# Patient Record
Sex: FEMALE | Race: WHITE | NOT HISPANIC OR LATINO | URBAN - METROPOLITAN AREA
[De-identification: names, ages, dates, MRNs, and addresses within clinical notes are randomized per-mention and may not be internally consistent; named-entity substitution may affect disease eponyms.]

---

## 2020-07-20 ENCOUNTER — OFFICE VISIT (OUTPATIENT)
Dept: URBAN - METROPOLITAN AREA TELEHEALTH 2 | Facility: TELEHEALTH | Age: 50
End: 2020-07-20

## 2020-07-23 ENCOUNTER — OFFICE VISIT (OUTPATIENT)
Dept: URBAN - METROPOLITAN AREA TELEHEALTH 2 | Facility: TELEHEALTH | Age: 50
End: 2020-07-23

## 2021-09-01 ENCOUNTER — OFFICE VISIT (OUTPATIENT)
Dept: URBAN - METROPOLITAN AREA CLINIC 63 | Facility: CLINIC | Age: 51
End: 2021-09-01

## 2022-07-09 ENCOUNTER — TELEPHONE ENCOUNTER (OUTPATIENT)
Dept: URBAN - METROPOLITAN AREA CLINIC 121 | Facility: CLINIC | Age: 52
End: 2022-07-09

## 2022-07-09 RX ORDER — DEXLANSOPRAZOLE 60 MG/1
TAKE 1 TAB PO QAM 30-60 MINS BEFORE BREAKFAST CAPSULE, DELAYED RELEASE ORAL ONCE A DAY
Refills: 11 | OUTPATIENT
Start: 2019-05-29 | End: 2021-08-31

## 2022-07-09 RX ORDER — ESOMEPRAZOLE MAGNESIUM 40 MG
ONCE A DAY CAPSULE,DELAYED RELEASE (ENTERIC COATED) ORAL ONCE A DAY
Refills: 2 | OUTPATIENT
Start: 2016-05-26 | End: 2016-12-29

## 2022-07-09 RX ORDER — DEXLANSOPRAZOLE 60 MG/1
TAKE 1 TAB PO QAM 30-60 MINS BEFORE BREAKFAST CAPSULE, DELAYED RELEASE ORAL ONCE A DAY
Refills: 6 | OUTPATIENT
Start: 2017-09-20 | End: 2018-09-28

## 2022-07-09 RX ORDER — CYCLOSPORINE 0.5 MG/ML
EMULSION OPHTHALMIC
Refills: 0 | OUTPATIENT
Start: 2021-02-22 | End: 2021-09-01

## 2022-07-09 RX ORDER — ALBUTEROL SULFATE 0.63 MG/3ML
SOLUTION RESPIRATORY (INHALATION)
Refills: 0 | OUTPATIENT
Start: 2021-01-21 | End: 2021-08-31

## 2022-07-09 RX ORDER — DEXLANSOPRAZOLE 60 MG/1
TAKE 1 TAB PO QAM 30-60 MINS BEFORE BREAKFAST CAPSULE, DELAYED RELEASE ORAL ONCE A DAY
Refills: 0 | OUTPATIENT
Start: 2019-03-18 | End: 2019-05-17

## 2022-07-09 RX ORDER — BUTALBITAL, ACETAMINOPHEN, CAFFEINE, AND CODEINE PHOSPHATE 50; 300; 40; 30 MG/1; MG/1; MG/1; MG/1
CAPSULE ORAL AS NEEDED
Refills: 0 | OUTPATIENT
Start: 2017-08-03 | End: 2018-03-20

## 2022-07-09 RX ORDER — ESTRADIOL 2 MG/1
TABLET ORAL
Refills: 0 | OUTPATIENT
Start: 2021-07-15 | End: 2021-09-01

## 2022-07-09 RX ORDER — PANTOPRAZOLE SODIUM 20 MG
TABLET, DELAYED RELEASE (ENTERIC COATED) ORAL ONCE A DAY
Refills: 2 | OUTPATIENT
Start: 2016-03-17 | End: 2016-03-17

## 2022-07-09 RX ORDER — DEXLANSOPRAZOLE 60 MG/1
TAKE 1 TAB PO QAM 30-60 MINS BEFORE BREAKFAST CAPSULE, DELAYED RELEASE ORAL ONCE A DAY
Refills: 0 | OUTPATIENT
Start: 2019-02-28 | End: 2019-03-18

## 2022-07-09 RX ORDER — DEXLANSOPRAZOLE 60 MG/1
TAKE 1 TAB PO QAM 30-60 MINS BEFORE BREAKFAST CAPSULE, DELAYED RELEASE ORAL ONCE A DAY
Refills: 0 | OUTPATIENT
Start: 2019-05-17 | End: 2021-08-31

## 2022-07-09 RX ORDER — CYCLOSPORINE 0.5 MG/ML
EMULSION OPHTHALMIC TWICE A DAY
Refills: 0 | OUTPATIENT
Start: 2012-03-01 | End: 2016-03-17

## 2022-07-09 RX ORDER — LEVOTHYROXINE SODIUM 100 MCG
ROTATE WITH 88MG TABLET ORAL ONCE A DAY
Refills: 0 | OUTPATIENT
Start: 2012-03-01 | End: 2016-03-17

## 2022-07-09 RX ORDER — ESOMEPRAZOLE MAGNESIUM 40 MG
1 Q AM 30-60MINUTES BEFORE BREAKFAST CAPSULE,DELAYED RELEASE (ENTERIC COATED) ORAL
Refills: 0 | OUTPATIENT
Start: 2012-05-16 | End: 2012-06-14

## 2022-07-09 RX ORDER — LEVOTHYROXINE SODIUM 100 MCG
ROTATE WITH 88MG TABLET ORAL ONCE A DAY
Refills: 2 | OUTPATIENT
Start: 2016-03-17 | End: 2016-03-17

## 2022-07-09 RX ORDER — DEXLANSOPRAZOLE 60 MG/1
TAKE 1 TAB PO QAM 30-60 MINS BEFORE BREAKFAST CAPSULE, DELAYED RELEASE ORAL ONCE A DAY
Refills: 4 | OUTPATIENT
Start: 2018-09-28 | End: 2019-02-28

## 2022-07-09 RX ORDER — TRIAMTERENE AND HYDROCHLOROTHIAZIDE 37.5; 25 MG/1; MG/1
CAPSULE ORAL ONCE A DAY
Refills: 2 | OUTPATIENT
Start: 2016-03-17 | End: 2016-05-26

## 2022-07-09 RX ORDER — LEVOTHYROXINE SODIUM 88 MCG
TABLET ORAL ONCE A DAY
Refills: 1 | OUTPATIENT
Start: 2016-03-17 | End: 2016-05-26

## 2022-07-09 RX ORDER — DEXLANSOPRAZOLE 60 MG/1
TAKE 1 TAB PO QAM 30-60 MINS BEFORE BREAKFAST CAPSULE, DELAYED RELEASE ORAL ONCE A DAY
Refills: 11 | OUTPATIENT
Start: 2020-07-23 | End: 2021-08-18

## 2022-07-09 RX ORDER — PANTOPRAZOLE SODIUM 20 MG
TABLET, DELAYED RELEASE (ENTERIC COATED) ORAL ONCE A DAY
Refills: 0 | OUTPATIENT
Start: 2012-03-01 | End: 2016-03-17

## 2022-07-09 RX ORDER — ESTRADIOL 2 MG/1
TABLET ORAL TAKE AS DIRECTED
Refills: 0 | OUTPATIENT
Start: 2019-05-07 | End: 2021-08-31

## 2022-07-09 RX ORDER — ESCITALOPRAM OXALATE 5 MG/1
TABLET, FILM COATED ORAL ONCE A DAY
Refills: 0 | OUTPATIENT
Start: 2018-02-15 | End: 2018-02-15

## 2022-07-09 RX ORDER — DEXLANSOPRAZOLE 60 MG/1
TAKE 1 TAB PO QAM 30-60 MINS BEFORE BREAKFAST CAPSULE, DELAYED RELEASE ORAL ONCE A DAY
Refills: 2 | OUTPATIENT
Start: 2017-09-14 | End: 2017-09-14

## 2022-07-09 RX ORDER — DEXLANSOPRAZOLE 60 MG/1
TAKE 1 TAB PO QAM 30-60 MINS BEFORE BREAKFAST CAPSULE, DELAYED RELEASE ORAL ONCE A DAY
Refills: 0 | OUTPATIENT
Start: 2021-08-18 | End: 2021-09-01

## 2022-07-09 RX ORDER — CYCLOBENZAPRINE HYDROCHLORIDE 10 MG/1
TABLET, FILM COATED ORAL ONCE A DAY
Refills: 0 | OUTPATIENT
Start: 2017-08-03 | End: 2017-08-03

## 2022-07-09 RX ORDER — CYCLOSPORINE 0.5 MG/ML
EMULSION OPHTHALMIC TWICE A DAY
Refills: 2 | OUTPATIENT
Start: 2016-03-17 | End: 2016-05-26

## 2022-07-09 RX ORDER — DEXLANSOPRAZOLE 60 MG/1
TAKE 1 CAPSULE BY MOUTH IN THE MORNING 30-60 MINUTES BEFORE BREAKFAST CAPSULE, DELAYED RELEASE ORAL ONCE A DAY
Refills: 1 | OUTPATIENT
Start: 2020-06-16 | End: 2021-08-31

## 2022-07-09 RX ORDER — CYCLOSPORINE 0.5 MG/ML
EMULSION OPHTHALMIC TAKE AS DIRECTED
Refills: 0 | OUTPATIENT
Start: 2019-05-29 | End: 2021-08-31

## 2022-07-09 RX ORDER — LEVOTHYROXINE SODIUM 88 MCG
TABLET ORAL ONCE A DAY
Refills: 0 | OUTPATIENT
Start: 2012-03-01 | End: 2016-03-17

## 2022-07-09 RX ORDER — MAGNESIUM OXIDE 200 MG
TABLET,CHEWABLE ORAL ONCE A DAY
Refills: 2 | OUTPATIENT
Start: 2016-03-17 | End: 2016-05-26

## 2022-07-09 RX ORDER — ZOLPIDEM TARTRATE 5 MG/1
TABLET, FILM COATED ORAL
Refills: 2 | OUTPATIENT
Start: 2016-03-17 | End: 2016-03-17

## 2022-07-09 RX ORDER — DEXLANSOPRAZOLE 60 MG/1
TAKE 1 TAB PO QAM 30-60 MINS BEFORE BREAKFAST CAPSULE, DELAYED RELEASE ORAL ONCE A DAY
Refills: 1 | OUTPATIENT
Start: 2017-08-03 | End: 2017-09-14

## 2022-07-09 RX ORDER — ZOLPIDEM TARTRATE 5 MG/1
TABLET, FILM COATED ORAL
Refills: 0 | OUTPATIENT
Start: 2012-03-01 | End: 2016-03-17

## 2022-07-09 RX ORDER — TOPIRAMATE 150 MG/1
CAPSULE, EXTENDED RELEASE ORAL ONCE A DAY
Refills: 2 | OUTPATIENT
Start: 2016-03-17 | End: 2016-05-26

## 2022-07-09 RX ORDER — TRIAMTERENE AND HYDROCHLOROTHIAZIDE 37.5; 25 MG/1; MG/1
CAPSULE ORAL ONCE A DAY
Refills: 0 | OUTPATIENT
Start: 2012-03-01 | End: 2016-03-17

## 2022-07-10 ENCOUNTER — TELEPHONE ENCOUNTER (OUTPATIENT)
Dept: URBAN - METROPOLITAN AREA CLINIC 121 | Facility: CLINIC | Age: 52
End: 2022-07-10

## 2022-07-10 RX ORDER — ESOMEPRAZOLE MAGNESIUM 40 MG
1 Q AM 30-60MINUTES BEFORE BREAKFAST CAPSULE,DELAYED RELEASE (ENTERIC COATED) ORAL
Refills: 3 | Status: ACTIVE | COMMUNITY
Start: 2012-06-14

## 2022-07-10 RX ORDER — TRIAMTERENE AND HYDROCHLOROTHIAZIDE 37.5; 25 MG/1; MG/1
CAPSULE ORAL ONCE A DAY
Refills: 2 | Status: ACTIVE | COMMUNITY
Start: 2016-05-26

## 2022-07-10 RX ORDER — ESTRADIOL 2 MG/1
TABLET ORAL ONCE A DAY
Refills: 0 | Status: ACTIVE | COMMUNITY
Start: 2021-09-01

## 2022-07-10 RX ORDER — LEVOTHYROXINE SODIUM 88 MCG
TABLET ORAL ONCE A DAY
Refills: 1 | Status: ACTIVE | COMMUNITY
Start: 2016-05-26

## 2022-07-10 RX ORDER — CYCLOBENZAPRINE HYDROCHLORIDE 10 MG/1
TABLET, FILM COATED ORAL AS NEEDED
Refills: 0 | Status: ACTIVE | COMMUNITY
Start: 2017-08-03

## 2022-07-10 RX ORDER — CIPROFLOXACIN HYDROCHLORIDE 500 MG/1
TABLET, FILM COATED ORAL TWICE A DAY
Refills: 0 | Status: ACTIVE | COMMUNITY
Start: 2013-01-16

## 2022-07-10 RX ORDER — MAGNESIUM OXIDE 200 MG
TABLET,CHEWABLE ORAL ONCE A DAY
Refills: 2 | Status: ACTIVE | COMMUNITY
Start: 2016-05-26

## 2022-07-10 RX ORDER — LEVOTHYROXINE SODIUM 88 MCG
TABLET ORAL ONCE A DAY
Refills: 0 | Status: ACTIVE | COMMUNITY
Start: 2021-09-01

## 2022-07-10 RX ORDER — DEXLANSOPRAZOLE 60 MG/1
TAKE 1 TAB PO QAM 30-60 MINS BEFORE BREAKFAST CAPSULE, DELAYED RELEASE ORAL ONCE A DAY
Refills: 11 | Status: ACTIVE | COMMUNITY
Start: 2021-09-01

## 2022-07-10 RX ORDER — CYCLOSPORINE 0.5 MG/ML
EMULSION OPHTHALMIC TWICE A DAY
Refills: 2 | Status: ACTIVE | COMMUNITY
Start: 2016-05-26

## 2022-07-10 RX ORDER — METRONIDAZOLE 500 MG/1
TABLET ORAL THREE TIMES A DAY
Refills: 0 | Status: ACTIVE | COMMUNITY
Start: 2013-01-16

## 2022-07-10 RX ORDER — TOPIRAMATE 150 MG/1
CAPSULE, EXTENDED RELEASE ORAL ONCE A DAY
Refills: 2 | Status: ACTIVE | COMMUNITY
Start: 2016-05-26

## 2022-07-10 RX ORDER — ESOMEPRAZOLE MAGNESIUM 40 MG
TAKE ONE BY MOUTH DAILY AS DIRECTED CAPSULE,DELAYED RELEASE (ENTERIC COATED) ORAL
Refills: 0 | Status: ACTIVE | COMMUNITY
Start: 2017-07-20

## 2022-07-10 RX ORDER — CYCLOSPORINE 0.5 MG/ML
EMULSION OPHTHALMIC
Refills: 0 | Status: ACTIVE | COMMUNITY
Start: 2021-09-01

## 2022-07-10 RX ORDER — TRAMADOL HCL 50 MG
TAKE 1 TAB PO Q6HRS PRN FOR PAIN TABLET ORAL
Refills: 0 | Status: ACTIVE | COMMUNITY
Start: 2013-01-29

## 2022-07-10 RX ORDER — ESOMEPRAZOLE MAGNESIUM 40 MG
TAKE ONE BY MOUTH DAILY AS DIRECTED CAPSULE,DELAYED RELEASE (ENTERIC COATED) ORAL
Refills: 2 | Status: ACTIVE | COMMUNITY
Start: 2016-12-29

## 2022-07-10 RX ORDER — BUTALBITAL, ACETAMINOPHEN, CAFFEINE, AND CODEINE PHOSPHATE 50; 300; 40; 30 MG/1; MG/1; MG/1; MG/1
CAPSULE ORAL AS NEEDED
Refills: 0 | Status: ACTIVE | COMMUNITY
Start: 2018-03-20

## 2022-09-20 ENCOUNTER — ERX REFILL RESPONSE (OUTPATIENT)
Dept: URBAN - METROPOLITAN AREA CLINIC 63 | Facility: CLINIC | Age: 52
End: 2022-09-20

## 2022-09-20 RX ORDER — DEXLANSOPRAZOLE 60 MG/1
TAKE 1 CAPSULE BY MOUTH EVERY MORNING 30-60 MINUTES BEFORE BREAKFAST CAPSULE, DELAYED RELEASE ORAL
Qty: 30 CAPSULE | Refills: 1 | OUTPATIENT

## 2022-09-20 RX ORDER — DEXLANSOPRAZOLE 60 MG/1
TAKE 1 TAB PO QAM 30-60 MINS BEFORE BREAKFAST CAPSULE, DELAYED RELEASE ORAL ONCE A DAY
Refills: 11 | OUTPATIENT

## 2022-09-27 ENCOUNTER — CLAIMS CREATED FROM THE CLAIM WINDOW (OUTPATIENT)
Dept: URBAN - METROPOLITAN AREA CLINIC 60 | Facility: CLINIC | Age: 52
End: 2022-09-27
Payer: COMMERCIAL

## 2022-09-27 ENCOUNTER — TELEPHONE ENCOUNTER (OUTPATIENT)
Dept: URBAN - METROPOLITAN AREA CLINIC 63 | Facility: CLINIC | Age: 52
End: 2022-09-27

## 2022-09-27 VITALS
HEIGHT: 62 IN | RESPIRATION RATE: 12 BRPM | SYSTOLIC BLOOD PRESSURE: 134 MMHG | OXYGEN SATURATION: 100 % | DIASTOLIC BLOOD PRESSURE: 72 MMHG | WEIGHT: 176.2 LBS | TEMPERATURE: 98.2 F | BODY MASS INDEX: 32.42 KG/M2 | HEART RATE: 70 BPM

## 2022-09-27 DIAGNOSIS — R10.30 LOWER ABDOMINAL PAIN: ICD-10-CM

## 2022-09-27 DIAGNOSIS — R10.32 LEFT LOWER QUADRANT ABDOMINAL PAIN: ICD-10-CM

## 2022-09-27 DIAGNOSIS — K59.09 OTHER CONSTIPATION: ICD-10-CM

## 2022-09-27 PROBLEM — 307496006: Status: ACTIVE | Noted: 2022-09-27

## 2022-09-27 PROCEDURE — 99214 OFFICE O/P EST MOD 30 MIN: CPT | Performed by: NURSE PRACTITIONER

## 2022-09-27 RX ORDER — MAGNESIUM OXIDE 200 MG
TABLET,CHEWABLE ORAL ONCE A DAY
Refills: 2 | Status: ACTIVE | COMMUNITY
Start: 2016-05-26

## 2022-09-27 RX ORDER — ESTRADIOL 2 MG/1
TABLET ORAL ONCE A DAY
Refills: 0 | Status: ACTIVE | COMMUNITY
Start: 2021-09-01

## 2022-09-27 RX ORDER — DEXLANSOPRAZOLE 60 MG/1
TAKE 1 CAPSULE BY MOUTH EVERY MORNING 30-60 MINUTES BEFORE BREAKFAST CAPSULE, DELAYED RELEASE ORAL
Qty: 30 CAPSULE | Refills: 1 | Status: ACTIVE | COMMUNITY

## 2022-09-27 RX ORDER — ATOGEPANT 60 MG/1
TABLET ORAL
Qty: 30 TABLET | Refills: 2 | Status: ACTIVE | COMMUNITY

## 2022-09-27 RX ORDER — CYCLOBENZAPRINE HYDROCHLORIDE 10 MG/1
TABLET, FILM COATED ORAL AS NEEDED
Refills: 0 | Status: ACTIVE | COMMUNITY
Start: 2017-08-03

## 2022-09-27 RX ORDER — LEVOTHYROXINE SODIUM 88 MCG
TABLET ORAL ONCE A DAY
Refills: 0 | Status: ACTIVE | COMMUNITY
Start: 2021-09-01

## 2022-09-27 RX ORDER — RIMEGEPANT SULFATE 75 MG/75MG
TABLET, ORALLY DISINTEGRATING ORAL
Qty: 8 TABLET | Refills: 1 | Status: ACTIVE | COMMUNITY

## 2022-09-27 RX ORDER — CYCLOSPORINE 0.5 MG/ML
EMULSION OPHTHALMIC TWICE A DAY
Refills: 2 | Status: ACTIVE | COMMUNITY
Start: 2016-05-26

## 2022-09-27 RX ORDER — DICYCLOMINE HYDROCHLORIDE 10 MG/1
1 CAPSULES CAPSULE ORAL
Qty: 60 | Refills: 1 | OUTPATIENT
Start: 2022-09-27 | End: 2022-11-25

## 2022-09-27 RX ORDER — DICYCLOMINE HYDROCHLORIDE 10 MG/1
1 CAPSULES CAPSULE ORAL
Qty: 60 | Refills: 1 | Status: ACTIVE | COMMUNITY
Start: 2022-09-27 | End: 2022-11-25

## 2022-09-27 NOTE — HPI-PREVIOUS PROCEDURES
Her last EGD was in 2016 and revealed reflux related change in the esophagus and chronic gastritis.  Her last colonoscopy was in March 2018.  This was performed for abdominal pain and bowel irregularities and revealed only small internal hemorrhoids.  The sigmoid colon was however moderately tortuous and there was some suspicion that she may have adhesions from prior surgery.  There was no evidence of diverticulosis on her colonoscopy. She has a family history of polyps.

## 2022-09-27 NOTE — PHYSICAL EXAM GASTROINTESTINAL
Abdomen , soft, mildly tender RLQ and LLQ, nondistended , no guarding or rigidity , no masses palpable , normal bowel sounds , Liver and Spleen , no hepatomegaly present , no hepatosplenomegaly , liver nontender , spleen not palpable

## 2022-09-27 NOTE — HPI-TODAY'S VISIT:
This is a 52-year-old female patient with a history of GERD who presents to the office for evaluation of constipation and abdominal pain x 3 weeks.  She was last seen in September 2021.  Today she reports episodes of constipation at times as her baseline along with occasional bouts of loose stools. However, over the past 3 weeks she has become increasingly constipated. She has tried increasing her fiber and eating more salads etc without much success. She has also been experiencing some lower abdominal pain over the past couple of weeks. She denies fever, admits to mild nausea on occasion. She has been trying to lose weight and has in general changed her diet because of that. She is eating more healthily.  She remains on Dexilant and denies heartburn, dysphagia or reflux providing she takes her medication.

## 2022-10-12 ENCOUNTER — TELEPHONE ENCOUNTER (OUTPATIENT)
Dept: URBAN - METROPOLITAN AREA CLINIC 63 | Facility: CLINIC | Age: 52
End: 2022-10-12

## 2022-10-19 ENCOUNTER — DASHBOARD ENCOUNTERS (OUTPATIENT)
Age: 52
End: 2022-10-19

## 2022-10-20 ENCOUNTER — OFFICE VISIT (OUTPATIENT)
Dept: URBAN - METROPOLITAN AREA TELEHEALTH 1 | Facility: TELEHEALTH | Age: 52
End: 2022-10-20
Payer: COMMERCIAL

## 2022-10-20 ENCOUNTER — TELEPHONE ENCOUNTER (OUTPATIENT)
Dept: URBAN - METROPOLITAN AREA CLINIC 63 | Facility: CLINIC | Age: 52
End: 2022-10-20

## 2022-10-20 VITALS — BODY MASS INDEX: 32.39 KG/M2 | HEIGHT: 62 IN | WEIGHT: 176 LBS

## 2022-10-20 DIAGNOSIS — K58.1 IRRITABLE BOWEL SYNDROME WITH CONSTIPATION: ICD-10-CM

## 2022-10-20 DIAGNOSIS — K59.09 OTHER CONSTIPATION: ICD-10-CM

## 2022-10-20 DIAGNOSIS — R10.84 GENERALIZED ABDOMINAL PAIN: ICD-10-CM

## 2022-10-20 PROBLEM — 440630006: Status: ACTIVE | Noted: 2022-10-20

## 2022-10-20 PROCEDURE — 99214 OFFICE O/P EST MOD 30 MIN: CPT | Performed by: NURSE PRACTITIONER

## 2022-10-20 RX ORDER — CYCLOBENZAPRINE HYDROCHLORIDE 10 MG/1
TABLET, FILM COATED ORAL AS NEEDED
Refills: 0 | Status: ACTIVE | COMMUNITY
Start: 2017-08-03

## 2022-10-20 RX ORDER — MAGNESIUM OXIDE 200 MG
TABLET,CHEWABLE ORAL ONCE A DAY
Refills: 2 | Status: ACTIVE | COMMUNITY
Start: 2016-05-26

## 2022-10-20 RX ORDER — LEVOTHYROXINE SODIUM 88 MCG
TABLET ORAL ONCE A DAY
Refills: 0 | Status: ACTIVE | COMMUNITY
Start: 2021-09-01

## 2022-10-20 RX ORDER — ATOGEPANT 60 MG/1
TABLET ORAL
Qty: 30 TABLET | Refills: 2 | Status: ACTIVE | COMMUNITY

## 2022-10-20 RX ORDER — ESTRADIOL 2 MG/1
TABLET ORAL ONCE A DAY
Refills: 0 | Status: ACTIVE | COMMUNITY
Start: 2021-09-01

## 2022-10-20 RX ORDER — DICYCLOMINE HYDROCHLORIDE 10 MG/1
1 CAPSULES CAPSULE ORAL
Qty: 60 | Refills: 1 | Status: ACTIVE | COMMUNITY
Start: 2022-09-27 | End: 2022-11-25

## 2022-10-20 RX ORDER — CYCLOSPORINE 0.5 MG/ML
EMULSION OPHTHALMIC TWICE A DAY
Refills: 2 | Status: ACTIVE | COMMUNITY
Start: 2016-05-26

## 2022-10-20 RX ORDER — RIMEGEPANT SULFATE 75 MG/75MG
TABLET, ORALLY DISINTEGRATING ORAL
Qty: 8 TABLET | Refills: 1 | Status: ACTIVE | COMMUNITY

## 2022-10-20 RX ORDER — DEXLANSOPRAZOLE 60 MG/1
TAKE 1 CAPSULE BY MOUTH EVERY MORNING 30-60 MINUTES BEFORE BREAKFAST CAPSULE, DELAYED RELEASE ORAL
Qty: 30 CAPSULE | Refills: 1 | Status: ACTIVE | COMMUNITY

## 2022-10-20 NOTE — HPI-TODAY'S VISIT:
This is a 52-year-old female patient with a history of GERD who presents to the office for re-evaluation of abdominal pain and to discuss her recent CT and any persistent symptoms. She was last seen on 9/27/22.  Today she reports that she is taking the Dicyclomine twice daily but still has a lot of abdominal discomfort. It is not severe, but is very bothersome. She feels that her pain is being caused by her constipation. She is taking the Miralax bid and still only moving her bowels about twice a week on average. She is following a high fiber diet. She states that her father struggled with constipation his entire life.  She remains on Dexilant and denies heartburn, dysphagia or reflux providing she takes her medication.

## 2022-10-20 NOTE — HPI-PREVIOUS IMAGING
Her CT performed on 9/27/22 did not reveal evidence of diverticulitis, which was a possible diagnosis. She was therefore advised to follow a high fiber diet and take the prescribed Dicyclomine 2-3 times daily.

## 2022-11-29 ENCOUNTER — WEB ENCOUNTER (OUTPATIENT)
Dept: URBAN - METROPOLITAN AREA CLINIC 60 | Facility: CLINIC | Age: 52
End: 2022-11-29

## 2022-11-29 RX ORDER — DEXLANSOPRAZOLE 60 MG/1
1 CAPSULE CAPSULE, DELAYED RELEASE ORAL ONCE A DAY
Qty: 30 TABLET | Refills: 6 | OUTPATIENT

## 2022-12-01 ENCOUNTER — OFFICE VISIT (OUTPATIENT)
Dept: URBAN - METROPOLITAN AREA TELEHEALTH 1 | Facility: TELEHEALTH | Age: 52
End: 2022-12-01
Payer: COMMERCIAL

## 2022-12-01 DIAGNOSIS — K21.00 GASTROESOPHAGEAL REFLUX DISEASE WITH ESOPHAGITIS WITHOUT HEMORRHAGE: ICD-10-CM

## 2022-12-01 DIAGNOSIS — K58.2 IRRITABLE BOWEL SYNDROME WITH BOTH CONSTIPATION AND DIARRHEA: ICD-10-CM

## 2022-12-01 PROBLEM — 10743008: Status: ACTIVE | Noted: 2022-12-01

## 2022-12-01 PROBLEM — 266433003: Status: ACTIVE | Noted: 2022-12-01

## 2022-12-01 PROCEDURE — 99214 OFFICE O/P EST MOD 30 MIN: CPT | Performed by: NURSE PRACTITIONER

## 2022-12-01 RX ORDER — DEXLANSOPRAZOLE 60 MG/1
1 CAPSULE CAPSULE, DELAYED RELEASE ORAL ONCE A DAY
Qty: 30 TABLET | Refills: 6 | Status: ACTIVE | COMMUNITY

## 2022-12-01 RX ORDER — CYCLOSPORINE 0.5 MG/ML
EMULSION OPHTHALMIC TWICE A DAY
Refills: 2 | Status: ACTIVE | COMMUNITY
Start: 2016-05-26

## 2022-12-01 RX ORDER — LEVOTHYROXINE SODIUM 88 MCG
TABLET ORAL ONCE A DAY
Refills: 0 | Status: ACTIVE | COMMUNITY
Start: 2021-09-01

## 2022-12-01 RX ORDER — ATOGEPANT 60 MG/1
TABLET ORAL
Qty: 30 TABLET | Refills: 2 | Status: ACTIVE | COMMUNITY

## 2022-12-01 RX ORDER — DEXLANSOPRAZOLE 60 MG/1
TAKE 1 CAPSULE BY MOUTH EVERY MORNING 30-60 MINUTES BEFORE BREAKFAST CAPSULE, DELAYED RELEASE ORAL
Qty: 30 CAPSULE | Refills: 1 | Status: ACTIVE | COMMUNITY

## 2022-12-01 RX ORDER — RIMEGEPANT SULFATE 75 MG/75MG
TABLET, ORALLY DISINTEGRATING ORAL
Qty: 8 TABLET | Refills: 1 | Status: ACTIVE | COMMUNITY

## 2022-12-01 RX ORDER — CYCLOBENZAPRINE HYDROCHLORIDE 10 MG/1
TABLET, FILM COATED ORAL AS NEEDED
Refills: 0 | Status: ACTIVE | COMMUNITY
Start: 2017-08-03

## 2022-12-01 RX ORDER — ESTRADIOL 2 MG/1
TABLET ORAL ONCE A DAY
Refills: 0 | Status: ACTIVE | COMMUNITY
Start: 2021-09-01

## 2022-12-01 RX ORDER — MAGNESIUM OXIDE 200 MG
TABLET,CHEWABLE ORAL ONCE A DAY
Refills: 2 | Status: ACTIVE | COMMUNITY
Start: 2016-05-26

## 2022-12-01 NOTE — HPI-TODAY'S VISIT:
This is a 52-year-old female patient with a history of GERD who presents for follow-up of abdominal pain.  She was last seen on 9/27/2022.  Today she reports doing much better. She is taking Lactulose 5mg daily and is moving her bowels regularly. She is only requiring the Dicyclomine prn and occasionally. Her GERD symptoms are well-controlled with her Dexilant.  When last seen she reported constipation and abdominal pain x3 weeks.  Her baseline is somewhat irregular with episodes of constipation and occasional bouts of loose stools.  However, she experienced worsening constipation and lower abdominal cramping pain.

## 2022-12-01 NOTE — HPI-PREVIOUS IMAGING
A CT was ordered to rule out diverticulitis.  This was performed on 9/27/2022.  Findings included a few scattered diverticula but no evidence of diverticulitis.  No other abnormal findings were noted.  The liver, spleen, pancreas, adrenal glands and kidneys appeared normal.  The small and large bowel were also normal in appearance.

## 2022-12-01 NOTE — HPI-PREVIOUS PROCEDURES
Her last colonoscopy was in March 2018, performed for abdominal pain and bowel irregularities and revealed only small internal hemorrhoids.  The sigmoid colon was however moderately tortuous and there was some suspicion that she may have ideations from prior surgery.  She has a family history of polyps.  Her last EGD was in 2016 and revealed reflux related change in the esophagus and chronic gastritis.

## 2022-12-31 ENCOUNTER — WEB ENCOUNTER (OUTPATIENT)
Dept: URBAN - METROPOLITAN AREA CLINIC 60 | Facility: CLINIC | Age: 52
End: 2022-12-31

## 2022-12-31 RX ORDER — ESOMEPRAZOLE MAGNESIUM 40 MG/1
1 CAPSULE CAPSULE, DELAYED RELEASE ORAL ONCE A DAY
Qty: 30 | OUTPATIENT
Start: 2023-01-04